# Patient Record
Sex: FEMALE | Race: WHITE | Employment: PART TIME | URBAN - METROPOLITAN AREA
[De-identification: names, ages, dates, MRNs, and addresses within clinical notes are randomized per-mention and may not be internally consistent; named-entity substitution may affect disease eponyms.]

---

## 2019-08-22 ENCOUNTER — TELEPHONE (OUTPATIENT)
Dept: INTERNAL MEDICINE CLINIC | Age: 54
End: 2019-08-22

## 2019-08-22 NOTE — TELEPHONE ENCOUNTER
Patient was last seen 06/2016, requesting to make an acute visit.  Aware she hasnt been seen in 3 years which makes her a new patient at this time and we are not accepting new patients , please double check with PCP

## 2019-08-23 ENCOUNTER — TELEPHONE (OUTPATIENT)
Dept: INTERNAL MEDICINE CLINIC | Age: 54
End: 2019-08-23

## 2019-08-23 ENCOUNTER — OFFICE VISIT (OUTPATIENT)
Dept: INTERNAL MEDICINE CLINIC | Age: 54
End: 2019-08-23

## 2019-08-23 ENCOUNTER — HOSPITAL ENCOUNTER (OUTPATIENT)
Dept: GENERAL RADIOLOGY | Age: 54
Discharge: HOME | End: 2019-08-23
Attending: INTERNAL MEDICINE
Payer: COMMERCIAL

## 2019-08-23 VITALS
TEMPERATURE: 98 F | HEIGHT: 66 IN | DIASTOLIC BLOOD PRESSURE: 79 MMHG | WEIGHT: 147 LBS | RESPIRATION RATE: 18 BRPM | HEART RATE: 95 BPM | SYSTOLIC BLOOD PRESSURE: 117 MMHG | BODY MASS INDEX: 23.63 KG/M2 | OXYGEN SATURATION: 97 %

## 2019-08-23 DIAGNOSIS — M25.542 ARTHRALGIA OF HANDS, BILATERAL: Primary | ICD-10-CM

## 2019-08-23 DIAGNOSIS — M25.572 ARTHRALGIA OF BOTH FEET: ICD-10-CM

## 2019-08-23 DIAGNOSIS — M25.541 ARTHRALGIA OF HANDS, BILATERAL: ICD-10-CM

## 2019-08-23 DIAGNOSIS — M25.541 ARTHRALGIA OF HANDS, BILATERAL: Primary | ICD-10-CM

## 2019-08-23 DIAGNOSIS — M25.542 ARTHRALGIA OF HANDS, BILATERAL: ICD-10-CM

## 2019-08-23 DIAGNOSIS — M25.571 ARTHRALGIA OF BOTH FEET: ICD-10-CM

## 2019-08-23 DIAGNOSIS — I73.00 RAYNAUD'S PHENOMENON WITHOUT GANGRENE: ICD-10-CM

## 2019-08-23 PROCEDURE — 73130 X-RAY EXAM OF HAND: CPT

## 2019-08-23 NOTE — TELEPHONE ENCOUNTER
PSR called patient and left VM advising patient seek care outside of practice today. Please advise PSR if DR. Evelyne Marie is able to see patient as a new patient for future appointments.

## 2019-08-23 NOTE — TELEPHONE ENCOUNTER
----- Message from Mena Perry sent at 8/23/2019  7:52 AM EDT -----  Regarding: MAHAMED Tucker/Telephone  General Message/Vendor Calls    Caller's first and last name:      Reason for call:      Cosimo Dates required yes/no and why: yes      Best contact number(s):   [phone]    Details to clarify the request: Patient has joint pain and it has been just a little over three years can you make an exception to see her today.  Please call as soon as possible       Mena Perry

## 2019-08-23 NOTE — TELEPHONE ENCOUNTER
----- Message from Diaz Mccallum sent at 8/23/2019  7:52 AM EDT -----  Regarding: MAHAMED Tucker/Telephone  General Message/Vendor Calls    Caller's first and last name:      Reason for call:      Yris Rabon required yes/no and why: yes      Best contact number(s):   [phone]    Details to clarify the request: Patient has joint pain and it has been just a little over three years can you make an exception to see her today.  Please call as soon as possible       Diaz Mccallum

## 2019-08-26 NOTE — PROGRESS NOTES
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 Presents with stiffness of both hands and feel, largely of MCP and PIP joints, for a few months. MTPs of feet  It is mild to moderate. Wakes up with moderate s/s. Improves some during the day. No FH of RA  She does work with horses and engages in strenuous pulling       No weakness. Note coldness of fingers in the cold weather for 2 years. No hx of tick bite    Review of Systems   All other systems reviewed and are negative, except as noted in HPI    Past Medical and Surgical History   has a past medical history of Abnormal Pap smear (1988), DDD (degenerative disc disease), lumbar (1990s), Dysplasia of skin, and GERD (gastroesophageal reflux disease). has a past surgical history that includes hx appendectomy (1970s); hx tonsillectomy (1980s); hx endoscopy (02/12/2016); hx hysterectomy (11/15/12); hx colonoscopy (01/15/2019); hx left salpingo-oophorectomy (06/2016); and hx cervical fusion (2017). reports that she has never smoked. She has never used smokeless tobacco. She reports that she drinks alcohol. She reports that she does not use drugs. family history includes Arthritis-osteo in her paternal uncle; Cancer in her maternal grandmother; Cancer (age of onset: 52) in her mother; Diabetes in her maternal grandmother; Parkinson's Disease in her father. Physical Exam   Nursing note and vitals reviewed. Blood pressure 117/79, pulse 95, temperature 98 °F (36.7 °C), temperature source Oral, resp. rate 18, height 5' 6\" (1.676 m), weight 147 lb (66.7 kg), last menstrual period 06/28/2011, SpO2 97 %. Constitutional: In no distress. Eyes: Conjunctivae are normal.  HEENT:  No LAD or thyromegaly   Cardiovascular: Normal rate. regular rhythm.   No murmurs  No edema  Pulmonary/Chest: Effort normal. clear to ausculation blaterally  Musculoskeletal:  no edema. Mild PIP and MCP tenderness  Abd:  Neurological: Alert and oriented. Grossly intact cranial nerves and motor function. Skin: No rash noted. Psychiatric: Normal mood and affect. Behavior is normal.     ASSESSMENT and PLAN  Diagnoses and all orders for this visit:    1. Arthralgia of hands, bilateral  2. Arthralgia of both feet  Suspicious for RA/inflammatory arthritis. Check xrays and labs. Refer prn  Try voltaren gel. She has some left over for her horses  -     METABOLIC PANEL, COMPREHENSIVE  -     CBC W/O DIFF  -     RHEUMATOID FACTOR, QL  -     CYCLIC CITRUL PEPTIDE AB, IGG  -     SED RATE (ESR)  -     C REACTIVE PROTEIN, QT  -     LYME AB/WESTERN BLOT REFLEX  -     XR HAND LT MIN 3 V; Future  -     XR HAND RT MIN 3 V; Future    3. Raynaud's phenomenon without gangrene        lab results and schedule of future lab studies reviewed with patient  reviewed medications and side effects in detail    Return to clinic for further evaluation if new symptoms develop or if current symptoms worsen or fail to resolve. There are no Patient Instructions on file for this visit.

## 2019-08-27 LAB
ALBUMIN SERPL-MCNC: 4.7 G/DL (ref 3.5–5.5)
ALBUMIN/GLOB SERPL: 2 {RATIO} (ref 1.2–2.2)
ALP SERPL-CCNC: 57 IU/L (ref 39–117)
ALT SERPL-CCNC: 12 IU/L (ref 0–32)
AST SERPL-CCNC: 13 IU/L (ref 0–40)
B BURGDOR IGG+IGM SER-ACNC: <0.91 ISR (ref 0–0.9)
B BURGDOR IGM SER IA-ACNC: <0.8 INDEX (ref 0–0.79)
BILIRUB SERPL-MCNC: 0.4 MG/DL (ref 0–1.2)
BUN SERPL-MCNC: 18 MG/DL (ref 6–24)
BUN/CREAT SERPL: 22 (ref 9–23)
CALCIUM SERPL-MCNC: 9.8 MG/DL (ref 8.7–10.2)
CCP IGA+IGG SERPL IA-ACNC: 3 UNITS (ref 0–19)
CHLORIDE SERPL-SCNC: 99 MMOL/L (ref 96–106)
CO2 SERPL-SCNC: 26 MMOL/L (ref 20–29)
CREAT SERPL-MCNC: 0.82 MG/DL (ref 0.57–1)
CRP SERPL-MCNC: <1 MG/L (ref 0–10)
ERYTHROCYTE [DISTWIDTH] IN BLOOD BY AUTOMATED COUNT: 14.2 % (ref 12.3–15.4)
ERYTHROCYTE [SEDIMENTATION RATE] IN BLOOD BY WESTERGREN METHOD: 4 MM/HR (ref 0–40)
GLOBULIN SER CALC-MCNC: 2.4 G/DL (ref 1.5–4.5)
GLUCOSE SERPL-MCNC: 86 MG/DL (ref 65–99)
HCT VFR BLD AUTO: 39.5 % (ref 34–46.6)
HGB BLD-MCNC: 13.2 G/DL (ref 11.1–15.9)
MCH RBC QN AUTO: 27.7 PG (ref 26.6–33)
MCHC RBC AUTO-ENTMCNC: 33.4 G/DL (ref 31.5–35.7)
MCV RBC AUTO: 83 FL (ref 79–97)
PLATELET # BLD AUTO: 266 X10E3/UL (ref 150–450)
POTASSIUM SERPL-SCNC: 4.3 MMOL/L (ref 3.5–5.2)
PROT SERPL-MCNC: 7.1 G/DL (ref 6–8.5)
RBC # BLD AUTO: 4.76 X10E6/UL (ref 3.77–5.28)
RHEUMATOID FACT SERPL-ACNC: <10 IU/ML (ref 0–13.9)
SODIUM SERPL-SCNC: 141 MMOL/L (ref 134–144)
TSH SERPL DL<=0.005 MIU/L-ACNC: 1.95 UIU/ML (ref 0.45–4.5)
WBC # BLD AUTO: 5.6 X10E3/UL (ref 3.4–10.8)

## 2020-01-22 ENCOUNTER — OFFICE VISIT (OUTPATIENT)
Dept: INTERNAL MEDICINE CLINIC | Age: 55
End: 2020-01-22

## 2020-01-22 VITALS
DIASTOLIC BLOOD PRESSURE: 75 MMHG | HEART RATE: 79 BPM | RESPIRATION RATE: 16 BRPM | WEIGHT: 148.21 LBS | TEMPERATURE: 98.1 F | OXYGEN SATURATION: 96 % | SYSTOLIC BLOOD PRESSURE: 158 MMHG | BODY MASS INDEX: 23.92 KG/M2

## 2020-01-22 DIAGNOSIS — H83.2X9 VESTIBULAR DISEQUILIBRIUM, UNSPECIFIED LATERALITY: Primary | ICD-10-CM

## 2020-01-22 RX ORDER — ONDANSETRON 8 MG/1
8 TABLET, ORALLY DISINTEGRATING ORAL
Qty: 10 TAB | Refills: 0 | Status: SHIPPED | OUTPATIENT
Start: 2020-01-22 | End: 2021-09-08 | Stop reason: ALTCHOICE

## 2020-01-22 RX ORDER — MECLIZINE HYDROCHLORIDE 25 MG/1
25 TABLET ORAL
Qty: 20 TAB | Refills: 1 | Status: SHIPPED | OUTPATIENT
Start: 2020-01-22 | End: 2020-02-01

## 2020-01-22 NOTE — PROGRESS NOTES
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 HPI  Vertigo: Pt reports that she woke at 5 am, rolled left, sat up and broke out in a cold sweat with disequilibrium. She feels like she is on a boat. Of note, her ears felt off over the last few weeks but no pressure, discomfort, or pain. She called out of work due to not being able to drive the hour to work with her vertigo. Confirms nausea and dizziness. Denies recent illness/ear infection, fever, different yoga poses, or exercises. Joint pain: Pt continues to continue with joint pain, which is worse in the mornings. Continues using Voltaren gel PRN. Pt is working part-time at [employer]. She has a farm approx 1h away with horses. Her  notes that she is basically working full time. Review of Systems   Musculoskeletal: Positive for joint pain. Neurological: Positive for dizziness (vertigo). All other systems reviewed and are negative. Physical Exam  Constitutional:       Appearance: Normal appearance. HENT:      Right Ear: Hearing, tympanic membrane and external ear normal.      Left Ear: Hearing, tympanic membrane and external ear normal.      Mouth/Throat:      Mouth: Mucous membranes are moist.      Pharynx: Oropharynx is clear. Cardiovascular:      Rate and Rhythm: Normal rate and regular rhythm. Pulmonary:      Effort: Pulmonary effort is normal.      Breath sounds: Normal breath sounds and air entry. Musculoskeletal: Normal range of motion. Skin:     General: Skin is warm and dry. Neurological:      General: No focal deficit present. Mental Status: She is alert and oriented to person, place, and time. Psychiatric:         Mood and Affect: Mood normal.         Behavior: Behavior normal.         ASSESSMENT and PLAN  Diagnoses and all orders for this visit:    1. Vestibular disequilibrium, unspecified laterality  Prescribed Zofran and Meclizine for pt to use PRN. Discussed and described PT with pt. Informed pt that Klonopin is a great option for severe vertigo at night, but since her sx do not seem severe- Meclizine should dissipate her sx. Explained that PT is a great option to help realign the inner ear crystals. Will continue to monitor for improvements or changes. Other orders  -     meclizine (ANTIVERT) 25 mg tablet; Take 1 Tab by mouth three (3) times daily as needed for Dizziness for up to 10 days. -     ondansetron (ZOFRAN ODT) 8 mg disintegrating tablet; Take 1 Tab by mouth every eight (8) hours as needed for Nausea or Vomiting. Additional comments: Discussed with pt that her labs came back negative for RA, and her joint pain is likely due to osteoarthritis. Continues on Voltaren gel PRN with relief. Lab results and schedule of future lab studies reviewed with patient. Reviewed diet, exercise and weight control. Written by Everardo Hoover, as dictated by Rosa Elena Francis MD.     Current diagnosis and concerns discussed with pt at length. Understands risks and benefits or current treatment plan and medications and accepts the treatment and medication with any possible risks. Pt asks appropriate questions which were answered. Pt instructed to call with any concerns or problems. This note will not be viewable in [address]

## 2020-07-30 ENCOUNTER — TELEPHONE (OUTPATIENT)
Dept: CARDIOLOGY CLINIC | Age: 55
End: 2020-07-30

## 2020-08-17 ENCOUNTER — OFFICE VISIT (OUTPATIENT)
Dept: CARDIOLOGY CLINIC | Age: 55
End: 2020-08-17
Payer: COMMERCIAL

## 2020-08-17 VITALS
HEIGHT: 66 IN | DIASTOLIC BLOOD PRESSURE: 82 MMHG | HEART RATE: 92 BPM | OXYGEN SATURATION: 97 % | WEIGHT: 149.6 LBS | SYSTOLIC BLOOD PRESSURE: 130 MMHG | BODY MASS INDEX: 24.04 KG/M2 | RESPIRATION RATE: 16 BRPM

## 2020-08-17 DIAGNOSIS — M51.36 DDD (DEGENERATIVE DISC DISEASE), LUMBAR: ICD-10-CM

## 2020-08-17 DIAGNOSIS — K21.9 GASTROESOPHAGEAL REFLUX DISEASE WITHOUT ESOPHAGITIS: ICD-10-CM

## 2020-08-17 DIAGNOSIS — R00.0 TACHYCARDIA: Primary | ICD-10-CM

## 2020-08-17 PROCEDURE — 93000 ELECTROCARDIOGRAM COMPLETE: CPT | Performed by: INTERNAL MEDICINE

## 2020-08-17 PROCEDURE — 99205 OFFICE O/P NEW HI 60 MIN: CPT | Performed by: INTERNAL MEDICINE

## 2020-08-17 RX ORDER — METOPROLOL TARTRATE 25 MG/1
12.5 TABLET, FILM COATED ORAL DAILY
COMMUNITY
End: 2021-09-08 | Stop reason: ALTCHOICE

## 2020-08-17 NOTE — PROGRESS NOTES
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 Katie Davison. She noted HR's in the 110-130 range. Later in 2014, she underwent Holter monitoring with Dr. Sandra Langston that demonstrated a long RP tachycardia at 155 bpm. She has been on metoprolol tartrate 25 mg which she predominantly takes daily (rather than BID). Recently she has noted HR's in the 110-115 bpm range. Though she denies edema, syncope, SOB, fatigue, and chest pain she wishes to address her risk for tachycardia-induced cardiomyopathy.         PAST MEDICAL HISTORY     Past Medical History:   Diagnosis Date    Abnormal Pap smear 1988    DDD (degenerative disc disease), lumbar 1990s    L4-L5 spinal stenosis, disc herniation 1990s, saw Dr. Donald Nunez Dysplasia of skin     Dr. Kyle Martinez GERD (gastroesophageal reflux disease)     EGD 1990s \"normal\"           PAST SURGICAL HISTORY     Past Surgical History:   Procedure Laterality Date    HX APPENDECTOMY  1970s    HX CERVICAL FUSION  2017    Dr. Elliott Quiroga    HX COLONOSCOPY  01/15/2019    normal    HX ENDOSCOPY  02/12/2016    normal    HX HYSTERECTOMY  11/15/12    benign fibroids; ovaries intact    HX LEFT SALPINGO-OOPHORECTOMY  06/2016    HX TONSILLECTOMY  1980s          ALLERGIES     No Known Allergies       FAMILY HISTORY     Family History   Problem Relation Age of Onset    Cancer Mother 52        melanoma    Diabetes Maternal Grandmother     Cancer Maternal Grandmother         melanoma    Parkinson's Disease Father     Arthritis-osteo Paternal Uncle     negative for cardiac disease       SOCIAL HISTORY     Social History     Socioeconomic History    Marital status:      Spouse name: Jason Ray Number of children: 0    Years of education: Not on file    Highest education level: Not on file   Occupational History    Occupation: Medical Doctor Internal Medicine Tobacco Use    Smoking status: Never Smoker    Smokeless tobacco: Never Used   Substance and Sexual Activity    Alcohol use: Yes     Alcohol/week: 2.0 - 3.0 standard drinks     Types: 2 - 3 Glasses of wine per week     Comment: occas    Drug use: No    Sexual activity: Yes         MEDICATIONS     Current Outpatient Medications   Medication Sig    metoprolol tartrate (LOPRESSOR) 25 mg tablet Take 12.5 mg by mouth daily.  ondansetron (ZOFRAN ODT) 8 mg disintegrating tablet Take 1 Tab by mouth every eight (8) hours as needed for Nausea or Vomiting.  lidocaine (LIDODERM) 5 % Apply patch to the affected area for 12 hours a day and remove for 12 hours a day. No current facility-administered medications for this visit. I have reviewed the nurses notes, vitals, problem list, allergy list, medical history, family, social history and medications. REVIEW OF SYMPTOMS      General: Pt denies excessive weight gain or loss. Pt is able to conduct ADL's  HEENT: Denies blurred vision, headaches, hearing loss, epistaxis and difficulty swallowing. Respiratory: Denies cough, congestion, shortness of breath, AQUINO, wheezing or stridor. Cardiovascular: Denies precordial pain, palpitations, edema or PND  Gastrointestinal: Denies poor appetite, indigestion, abdominal pain or blood in stool  Genitourinary: Denies hematuria, dysuria, increased urinary frequency  Musculoskeletal: Denies joint pain or swelling from muscles or joints  Neurologic: Denies tremor, paresthesias, headache, or sensory motor disturbance  Psychiatric: Denies confusion, insomnia, depression  Integumentray: Denies rash, itching or ulcers. Hematologic: Denies easy bruising, bleeding       PHYSICAL EXAMINATION      Vitals: see vitals section  General: Well developed, in no acute distress. HEENT: No jaundice, oral mucosa moist, no oral ulcers  Neck: Supple, no stiffness, no lymphadenopathy, supple  Heart:  Normal S1/S2 negative S3 or S4. Regular, no murmur, gallop or rub, no jugular venous distention  Respiratory: Clear bilaterally x 4, no wheezing or rales  Abdomen:   Soft, non-tender, bowel sounds are active. Extremities:  No edema, normal cap refill, no cyanosis. Musculoskeletal: No clubbing, no deformities  Neuro: A&Ox3, speech clear, gait stable, cooperative, no focal neurologic deficits  Skin: Skin color is normal. No rashes or lesions. Non diaphoretic, moist.  Vascular: 2+ pulses symmetric in all extremities       DIAGNOSTIC DATA      EKG:        LABORATORY DATA      Lab Results   Component Value Date/Time    WBC 5.6 08/23/2019 03:29 PM    HGB 13.2 08/23/2019 03:29 PM    HCT 39.5 08/23/2019 03:29 PM    PLATELET 825 61/86/7997 03:29 PM    MCV 83 08/23/2019 03:29 PM      Lab Results   Component Value Date/Time    Sodium 141 08/23/2019 03:29 PM    Potassium 4.3 08/23/2019 03:29 PM    Chloride 99 08/23/2019 03:29 PM    CO2 26 08/23/2019 03:29 PM    Anion gap 9 08/23/2010 09:26 AM    Glucose 86 08/23/2019 03:29 PM    BUN 18 08/23/2019 03:29 PM    Creatinine 0.82 08/23/2019 03:29 PM    BUN/Creatinine ratio 22 08/23/2019 03:29 PM    GFR est AA 95 08/23/2019 03:29 PM    GFR est non-AA 83 08/23/2019 03:29 PM    Calcium 9.8 08/23/2019 03:29 PM    Bilirubin, total 0.4 08/23/2019 03:29 PM    Alk. phosphatase 57 08/23/2019 03:29 PM    Protein, total 7.1 08/23/2019 03:29 PM    Albumin 4.7 08/23/2019 03:29 PM    Globulin 3.4 08/23/2010 09:26 AM    A-G Ratio 2.0 08/23/2019 03:29 PM    ALT (SGPT) 12 08/23/2019 03:29 PM           ASSESSMENT      1. Tachycardia  2. GERD       PLAN     Patient will invest in an iWatch for rhythm monitoring. She will monitor for 1-2 weeks while off metoprolol. She will send strips for my review to guide future direction (ie increase toprol, consider MRV, consider EPS?). ICD-10-CM ICD-9-CM    1.  Tachycardia  R00.0 785.0 AMB POC EKG ROUTINE W/ 12 LEADS, INTER & REP   2. Gastroesophageal reflux disease without esophagitis K21.9 530.81    3. DDD (degenerative disc disease), lumbar  M51.36 722.52      Orders Placed This Encounter    AMB POC EKG ROUTINE W/ 12 LEADS, INTER & REP     Order Specific Question:   Reason for Exam:     Answer: Tachycardia    metoprolol tartrate (LOPRESSOR) 25 mg tablet     Sig: Take 12.5 mg by mouth daily. FOLLOW-UP     TBD      Thank you, Erma Schwab, MD for allowing me to participate in the care of this extraordinarily pleasant female. Please do not hesitate to contact me for further questions/concerns.          Rianna Gonsalez MD  Cardiac Electrophysiology / Cardiology    [address]  [address], [address] [facility], [address]  [place] [address]    Dashawn Henderson  [phone] / [phone] Fax   [phone] / [phone] Fax

## 2020-08-17 NOTE — PROGRESS NOTES
ROOM # 3  Baseline , has seen 2 other cardiologist Dr. Kristen Mendoza and Dr. Lawler Safe  Visit Vitals  /82 (BP 1 Location: Left arm, BP Patient Position: Sitting)   Pulse 92   Resp 16   Ht 5' 6\" (1.676 m)   Wt 149 lb 9.6 oz (67.9 kg)   LMP 06/28/2011   SpO2 97%   BMI 24.15 kg/m²

## 2021-01-05 ENCOUNTER — PATIENT MESSAGE (OUTPATIENT)
Dept: INTERNAL MEDICINE CLINIC | Age: 56
End: 2021-01-05

## 2021-09-08 ENCOUNTER — OFFICE VISIT (OUTPATIENT)
Dept: INTERNAL MEDICINE CLINIC | Age: 56
End: 2021-09-08
Payer: COMMERCIAL

## 2021-09-08 VITALS
DIASTOLIC BLOOD PRESSURE: 84 MMHG | RESPIRATION RATE: 14 BRPM | HEIGHT: 66 IN | BODY MASS INDEX: 24.15 KG/M2 | HEART RATE: 82 BPM | SYSTOLIC BLOOD PRESSURE: 127 MMHG | OXYGEN SATURATION: 98 % | TEMPERATURE: 98.3 F

## 2021-09-08 DIAGNOSIS — R53.82 CHRONIC FATIGUE: ICD-10-CM

## 2021-09-08 DIAGNOSIS — R06.83 SNORING: ICD-10-CM

## 2021-09-08 DIAGNOSIS — G47.30 SLEEP APNEA, UNSPECIFIED TYPE: ICD-10-CM

## 2021-09-08 DIAGNOSIS — G47.00 INSOMNIA, UNSPECIFIED TYPE: ICD-10-CM

## 2021-09-08 DIAGNOSIS — R06.81 WITNESSED APNEIC SPELLS: Primary | ICD-10-CM

## 2021-09-08 DIAGNOSIS — R40.0 DAYTIME SOMNOLENCE: ICD-10-CM

## 2021-09-08 PROCEDURE — 99213 OFFICE O/P EST LOW 20 MIN: CPT | Performed by: INTERNAL MEDICINE

## 2021-09-09 NOTE — PROGRESS NOTES
HISTORY OF PRESENT ILLNESS    Chief Complaint   Patient presents with    Sleep Apnea     f/u       Presents for follow-up  Reports fatigue. Does not feel rest for much of the day. She is not sleeping well. She is waking up gasping at times. Some issues staying asleep. Review of Systems   All other systems reviewed and are negative, except as noted in HPI    Past Medical and Surgical History   has a past medical history of Abnormal Pap smear (1988), CTS (carpal tunnel syndrome), DDD (degenerative disc disease), lumbar (1990s), Dysplasia of skin, FH: melanoma, GERD (gastroesophageal reflux disease), and Tachycardia. has a past surgical history that includes hx appendectomy (1970s); hx tonsillectomy (1980s); hx endoscopy (02/12/2016); hx hysterectomy (11/15/12); hx colonoscopy (01/15/2019); hx left salpingo-oophorectomy (06/2016); hx cervical fusion (2017); and hx endoscopy (03/09/2021). reports that she has never smoked. She has never used smokeless tobacco. She reports current alcohol use of about 2.0 - 3.0 standard drinks of alcohol per week. She reports that she does not use drugs. family history includes Arthritis-osteo in her paternal uncle; Cancer in her brother and maternal grandmother; Cancer (age of onset: 52) in her mother; Diabetes in her maternal grandmother; Parkinson's Disease in her father. Physical Exam   Nursing note and vitals reviewed. Blood pressure 127/84, pulse 82, temperature 98.3 °F (36.8 °C), temperature source Oral, resp. rate 14, height 5' 6\" (1.676 m), last menstrual period 06/28/2011, SpO2 98 %. Constitutional:  No distress. Eyes: Conjunctivae are normal.   Ears:  Hearing grossly intact  Cardiovascular: Normal rate. regular rhythm, no murmurs or gallops  No edema  Pulmonary/Chest: Effort normal.   CTAB  Musculoskeletal: moves all 4 extremities   Neurological: Alert and oriented to person, place, and time. Skin: No visible rash noted.    Psychiatric: Normal mood and affect. Behavior is normal.     ASSESSMENT and PLAN  Diagnoses and all orders for this visit:    1. Witnessed apneic spells  2. Daytime somnolence  3. Insomnia, unspecified type  4. Sleep apnea, unspecified type  5. Chronic fatigue  6. Snoring  -     REFERRAL TO SLEEP STUDIES at home to evaluate for MERLY    lab results and schedule of future lab studies reviewed with patient  reviewed medications and side effects in detail    Return to clinic for further evaluation if new symptoms develop        No current outpatient medications on file. No current facility-administered medications for this visit.

## 2022-09-28 ENCOUNTER — OFFICE VISIT (OUTPATIENT)
Dept: INTERNAL MEDICINE CLINIC | Age: 57
End: 2022-09-28
Payer: COMMERCIAL

## 2022-09-28 VITALS
SYSTOLIC BLOOD PRESSURE: 121 MMHG | WEIGHT: 144.6 LBS | BODY MASS INDEX: 23.24 KG/M2 | DIASTOLIC BLOOD PRESSURE: 80 MMHG | OXYGEN SATURATION: 98 % | HEIGHT: 66 IN | TEMPERATURE: 98.8 F | RESPIRATION RATE: 16 BRPM | HEART RATE: 104 BPM

## 2022-09-28 DIAGNOSIS — M62.838 NECK MUSCLE SPASM: ICD-10-CM

## 2022-09-28 DIAGNOSIS — Z80.8 FH: MELANOMA: ICD-10-CM

## 2022-09-28 DIAGNOSIS — M26.609 TMJ (TEMPOROMANDIBULAR JOINT DISORDER): ICD-10-CM

## 2022-09-28 DIAGNOSIS — D22.5: Primary | ICD-10-CM

## 2022-09-28 PROBLEM — M54.2 CERVICAL SPINE PAIN: Status: ACTIVE | Noted: 2022-09-28

## 2022-09-28 PROCEDURE — 99213 OFFICE O/P EST LOW 20 MIN: CPT | Performed by: INTERNAL MEDICINE

## 2022-09-28 RX ORDER — BACLOFEN 10 MG/1
10 TABLET ORAL
Qty: 30 TABLET | Refills: 2 | Status: SHIPPED | OUTPATIENT
Start: 2022-09-28

## 2022-09-28 NOTE — PROGRESS NOTES
HISTORY OF PRESENT ILLNESS    Chief Complaint   Patient presents with    Sleep Problem     F/up       Presents for follow-up    She is concerned about a mole on her left lower back which is irregular. Not clear how long it has been there or if it Is changing. FH of melanoma in her mother who sadly  ot it age 45s. Also brother dx in age 45s. She saw Dr. Carole Golden in the past.  She was able to make an appt in Feb with Dr. Cynthia Gonzalez. Reports TMJ pains. Saw dentist and was given bite guard. Has spasm and also has neck pains and spasm. No weakness. Review of Systems   All other systems reviewed and are negative, except as noted in HPI    Past Medical and Surgical History   has a past medical history of Abnormal Pap smear (), CTS (carpal tunnel syndrome), DDD (degenerative disc disease), lumbar (), Dysplasia of skin, FH: melanoma, GERD (gastroesophageal reflux disease), Insomnia, and Tachycardia. has a past surgical history that includes hx appendectomy (); hx tonsillectomy (); hx endoscopy (2016); hx hysterectomy (11/15/12); hx colonoscopy (01/15/2019); hx left salpingo-oophorectomy (2016); hx cervical fusion (); and hx endoscopy (2021). reports that she has never smoked. She has never used smokeless tobacco. She reports current alcohol use of about 2.0 - 3.0 standard drinks per week. She reports that she does not use drugs. family history includes Cancer in her brother and maternal grandmother; Cancer (age of onset: 52) in her mother; Diabetes in her maternal grandmother; OSTEOARTHRITIS in her paternal uncle; Parkinson's Disease in her father. Physical Exam   Nursing note and vitals reviewed. Blood pressure 121/80, pulse (!) 104, temperature 98.8 °F (37.1 °C), temperature source Oral, resp. rate 16, height 5' 6\" (1.676 m), weight 144 lb 9.6 oz (65.6 kg), last menstrual period 2011, SpO2 98 %. Constitutional:  No distress.     Eyes: Conjunctivae are normal.   Ears:  Hearing grossly intact  Cardiovascular: Normal rate. regular rhythm, no murmurs or gallops  No edema  Pulmonary/Chest: Effort normal.   CTAB  Musculoskeletal: moves all 4 extremities   Neurological: Alert and oriented to person, place, and time. Skin: No visible rash noted. Psychiatric: Normal mood and affect. Behavior is normal.   Left lower back        Assessment and Plan    Diagnoses and all orders for this visit:    1. Atypical nevus of left lower back  2. FH: melanoma  Atypical, but unclear if any changes over time. Recommend consultation with dermatology. I was able to share images with Chloé Salas NP at [email or web address], who will see her soon.  -     REFERRAL TO DERMATOLOGY    3. TMJ (temporomandibular joint disorder)  4. Neck muscle spasm  Uncontrolled. TMJ s/s and neck spasm. S/p cervical fusion. Recommend trial of baclofen 1/4-1/2 pill hs.    -     baclofen (LIORESAL) 10 mg tablet; Take 1 Tablet by mouth nightly.     lab results and schedule of future lab studies reviewed with patient  reviewed medications and side effects in detail    Return to clinic for further evaluation if new symptoms develop

## 2023-02-02 ENCOUNTER — HOSPITAL ENCOUNTER (OUTPATIENT)
Age: 58
Setting detail: OUTPATIENT SURGERY
Discharge: HOME | End: 2023-02-02
Attending: INTERNAL MEDICINE | Admitting: INTERNAL MEDICINE
Payer: COMMERCIAL

## 2023-02-02 VITALS
HEIGHT: 66 IN | RESPIRATION RATE: 16 BRPM | BODY MASS INDEX: 22.5 KG/M2 | OXYGEN SATURATION: 100 % | WEIGHT: 140 LBS | HEART RATE: 80 BPM | TEMPERATURE: 98.7 F | SYSTOLIC BLOOD PRESSURE: 110 MMHG | DIASTOLIC BLOOD PRESSURE: 76 MMHG

## 2023-02-02 PROCEDURE — 76040000019: Performed by: INTERNAL MEDICINE

## 2023-02-02 PROCEDURE — 2709999900 HC NON-CHARGEABLE SUPPLY: Performed by: INTERNAL MEDICINE

## 2023-02-02 RX ORDER — HYDROMORPHONE HYDROCHLORIDE 1 MG/ML
.25-1 INJECTION, SOLUTION INTRAMUSCULAR; INTRAVENOUS; SUBCUTANEOUS
Status: DISCONTINUED | OUTPATIENT
Start: 2023-02-02 | End: 2023-02-02 | Stop reason: HOSPADM

## 2023-02-02 RX ORDER — DEXTROMETHORPHAN/PSEUDOEPHED 2.5-7.5/.8
1.2 DROPS ORAL
Status: DISCONTINUED | OUTPATIENT
Start: 2023-02-02 | End: 2023-02-02 | Stop reason: HOSPADM

## 2023-02-02 RX ORDER — EPINEPHRINE 0.1 MG/ML
1 INJECTION INTRACARDIAC; INTRAVENOUS
Status: DISCONTINUED | OUTPATIENT
Start: 2023-02-02 | End: 2023-02-02 | Stop reason: HOSPADM

## 2023-02-02 RX ORDER — MIDAZOLAM HYDROCHLORIDE 1 MG/ML
.25-5 INJECTION, SOLUTION INTRAMUSCULAR; INTRAVENOUS
Status: DISCONTINUED | OUTPATIENT
Start: 2023-02-02 | End: 2023-02-02 | Stop reason: HOSPADM

## 2023-02-02 RX ORDER — ATROPINE SULFATE 0.1 MG/ML
0.4 INJECTION INTRAVENOUS
Status: DISCONTINUED | OUTPATIENT
Start: 2023-02-02 | End: 2023-02-02 | Stop reason: HOSPADM

## 2023-02-02 RX ORDER — SODIUM CHLORIDE 9 MG/ML
50 INJECTION, SOLUTION INTRAVENOUS CONTINUOUS
Status: DISCONTINUED | OUTPATIENT
Start: 2023-02-02 | End: 2023-02-02 | Stop reason: HOSPADM

## 2023-02-02 RX ORDER — DIPHENHYDRAMINE HYDROCHLORIDE 50 MG/ML
12.5 INJECTION, SOLUTION INTRAMUSCULAR; INTRAVENOUS AS NEEDED
Status: DISCONTINUED | OUTPATIENT
Start: 2023-02-02 | End: 2023-02-02 | Stop reason: HOSPADM

## 2023-02-02 RX ORDER — FLUMAZENIL 0.1 MG/ML
0.2 INJECTION INTRAVENOUS
Status: DISCONTINUED | OUTPATIENT
Start: 2023-02-02 | End: 2023-02-02 | Stop reason: HOSPADM

## 2023-02-02 RX ORDER — SODIUM CHLORIDE, SODIUM LACTATE, POTASSIUM CHLORIDE, CALCIUM CHLORIDE 600; 310; 30; 20 MG/100ML; MG/100ML; MG/100ML; MG/100ML
125 INJECTION, SOLUTION INTRAVENOUS CONTINUOUS
Status: DISCONTINUED | OUTPATIENT
Start: 2023-02-02 | End: 2023-02-02 | Stop reason: HOSPADM

## 2023-02-02 RX ORDER — NALOXONE HYDROCHLORIDE 0.4 MG/ML
0.4 INJECTION, SOLUTION INTRAMUSCULAR; INTRAVENOUS; SUBCUTANEOUS
Status: DISCONTINUED | OUTPATIENT
Start: 2023-02-02 | End: 2023-02-02 | Stop reason: HOSPADM

## 2023-02-02 NOTE — PERIOP NOTES
Anesthesia staff at patient's bedside administering anesthesia and monitoring patients vital signs throughout procedure. See anesthesia note. Endoscope was pre-cleaned at bedside immediately following procedure by chrissy. Pt transported to recovery, placed on bedside cardiac monitor. VSS. Report given to PACU RN [facility] using SBAR format. Opportunity for questions provided, and answered.

## 2023-02-02 NOTE — PROGRESS NOTES
Angélica Beeing  1965  [id number]    Situation:  Verbal report received from:   Carson Perla RN   Procedure: Procedure(s): FLEXIBLE SIGMOIDOSCOPY    Background:    Preoperative diagnosis: RECTAL BLEEDING  Postoperative diagnosis: * No post-op diagnosis entered *    :  Dr. Casey Valdovinos   Assistant(s): Endoscopy Technician-1: Binh De LPN  Endoscopy RN-1: Janell Peterson RN    Specimens: * No specimens in log *  H. Pylori  no    Assessment:  Intra-procedure medications     Anesthesia gave intra-procedure sedation and medications, see anesthesia flow sheet no anesthesia     Intravenous fluids: NS@ KVO     Vital signs stable   yes    Abdominal assessment: round and soft   yes    Recommendation:  Discharge patient per MD order  yes.   Return to floor  outpatient  Family or Friend   self driving no anesthesia  Permission to share finding with family or friend yes and n/a

## 2023-02-02 NOTE — PROCEDURES
Merari Blackwood M.D.  [phone]            2023          Flexible sigmoidoscopy Operative Report  Angélica Duncan  :  1965  [facility] Medical Record Number:  [id number]      Indications:    Lower rectal bleeding     :  Santi George MD    Referring Provider: Jasmeet Jeff MD    Sedation:  None    Pre-Procedural Exam:      Airway: clear,  No airway problems anticipated  Heart: RRR, without gallops or rubs  Lungs: clear bilaterally without wheezes, crackles, or rhonchi  Abdomen: soft, nontender, nondistended, bowel sounds present  Mental Status: awake, alert and oriented to person, place and time     Procedure Details:  After informed consent was obtained with all risks and benefits of procedure explained and preoperative exam completed, the patient was taken to the endoscopy suite and placed in the left lateral decubitus position. Upon sequential sedation as per above, a digital rectal exam was performed. The Olympus videocolonoscope  was inserted in the rectum and carefully advanced to the descending colon. The quality of preparation was excellent. The colonoscope was slowly withdrawn with careful inspection and evaluation between folds. Retroflexion in the rectum was performed. Findings:     The pediatric scope was advanced from the rectum into the sigmoid and distal descending colon. A sharp turn prevented further passage comfortably as she was not sedated. Gradual retrieval revealed normal mucosa. Small internal hemorrhoids were noted. Otherwise no lesions seen through the visualized area. Interventions:  none    Specimen Removed:  none    Complications: None. EBL:  None.     Impression:  Small internal hemorrhoids, otherwise mucosa within normal.    Recommendations:  - Maintain a high fiber diet  - Start daily fiber supplements  - Follow-up office visit if bleeding is recurrent, rubber band ligation can be performed    Discharge Disposition: Home in the company of a  when able to ambulate.     Sina Leone MD  2/2/2023  1:21 PM

## 2023-02-02 NOTE — PROGRESS NOTES
Endoscopy discharge instructions have been reviewed and given to patient. The patient verbalized understanding and acceptance of instructions. Dr. Josselyn Quiroz discussed with patient procedure findings and next steps.

## 2023-02-02 NOTE — DISCHARGE INSTRUCTIONS
[facility]. Tanner Munoz M.D.  [phone]            FLEXIBLE SIGMOIDOSCOPY DISCHARGE INSTRUCTIONS       2023    Bridget Alvarado  :  1965  [facility] Medical Record Number:  [id number]      COLONOSCOPY FINDINGS:  Your sigmoidoscopy showed normal mucosa in the rectum, sigmoid colon and distal descending colon. Small internal hemorrhoids were noted. DISCOMFORT:  Redness at IV site- apply warm compress to area; if redness or soreness persist- contact your physician  There may be a slight amount of blood passed from the rectum  Gaseous discomfort- walking, belching will help relieve any discomfort  You may not operate a vehicle for 12 hours  You may not engage in an occupation involving machinery or appliances for rest of today  You may not drink alcoholic beverages for at least 12 hours  Avoid making any critical decisions for at least 24 hour  DIET:   High fiber diet. - however -  remember your colon is empty and a heavy meal will produce gas. Avoid these foods:  vegetables, fried / greasy foods, carbonated drinks for today     ACTIVITY:  You may resume your normal daily activities it is recommended that you spend the remainder of the day resting -  avoid any strenuous activity. CALL M.D. ANY SIGN OF:   Increasing pain, nausea, vomiting  Abdominal distension (swelling)  New increased bleeding (oral or rectal)  Fever (chills)  Pain in chest area  Bloody discharge from nose or mouth   Shortness of breath    Follow-up Instructions:   Call Dr. Terrence Toscano if any questions or problems. Telephone # [phone]  Start taking fiber supplements daily, such as Citrucel or Benefiber, 1 tablespoon in 8 oz water daily. Please call me if bleeding is recurrent.

## 2023-02-02 NOTE — H&P
The patient is a 65year old female who presents with a complaint of Rectal bleeding. Note for \"Rectal bleeding\": This is telehealth appointment. Normal colonoscopy aside from internal hemorrhoids in 1/2019. Today, she states she started having rectal bleeding a few months ago. Initially had bleeding daily x 1 week but since has had bleeding off and on. No change in bowel habits. No rectal pain or abdominal pain. Problem List/Past Medical Denis Mcbride; 1/16/2023 12:41 PM)  Abdominal pain, LLQ (R10.32)   Pt presents for screening colonoscopy. She has been having chronic LLQ pain which is worse with constipation and better after bowel movements. Has occasional blood on toilet tissue which she notes when she has constipation. She has history of previous abdominal surgeries. She states she has been treated emprically in the past for diverticulitis and has had negative CTs in the past for the pain. Dysphagia (R13.10)   Pt presents with new complaints of dysphagia. Reports this has been ongoing for about a year and has worsened over the past couple of weeks. She has dysphagia to tylenol and sometimes liquids. There has been no regurgitaiton or vomiting. Her GERD symptoms have been well controlled with diet modifications. Constipation (K59.00)    Esophageal reflux (K21.9)      Past Surgical History Denis Mcbride; 1/16/2023 12:41 PM)  Spinal Fusion - Neck    Appendectomy      Allergies Denis Mcbride; 1/16/2023 12:41 PM)  Versed *HYPNOTICS/SEDATIVES/SLEEP DISORDER AGENTS*   Rash. Medication History Denis Mcbride; 1/16/2023 12:42 PM)  Medications Reconciled   predniSONE  (10MG (21) Tab Ther Pack, Oral) Active. No Current Medications  (Taken starting 01/16/2023)  Cyclobenzaprine HCl  (10MG Tablet, Oral daily) Active. Social History Denis Mcbride; 1/16/2023 12:41 PM)  Alcohol Use   Occasional alcohol use. Tobacco Use   Never smoker.   Blood Transfusion   No.  Employment status Full-time. Marital status   . Diagnostic Studies History Sil Arevalo; 1/16/2023 12:41 PM)  Colonoscopy   [01/15/2019]:  Endoscopy   [02/12/2016]:    Health Maintenance History Sil Arevalo; 1/16/2023 12:41 PM)  Pneumovax   None. Flu Vaccine   [2020]: Other Problems Sil Arevalo; 1/16/2023 12:41 PM)  Colon cancer screening (Z12.11)          Review of Systems Sil Arevaol; 1/16/2023 12:41 PM)  General Not Present- Chronic Fatigue, Poor Appetite, Weight Gain and Weight Loss. Skin Not Present- Itching, Rash and Skin Color Changes. HEENT Not Present- Hearing Loss and Vertigo. Respiratory Not Present- Difficulty Breathing and TB exposure. Cardiovascular Not Present- Chest Pain, Use of Antibiotics before Dental Procedures and Use of Blood Thinners. Gastrointestinal Present- See HPI. Musculoskeletal Not Present- Arthritis, Hip Replacement Surgery and Knee Replacement Surgery. Neurological Not Present- Weakness. Psychiatric Not Present- Depression. Endocrine Not Present- Diabetes and Thyroid Problems. Hematology Not Present- Anemia. Vitals Sil Arevalo; 1/16/2023 12:41 PM)  1/16/2023 12:41 PM  Weight: 150 lb   Height: 66 in   Weight was reported by patient. Height was reported by patient. Body Surface Area: 1.77 m²   Body Mass Index: 24.21 kg/m²                Assessment & Plan (Jennifer MANE; 1/16/2023 1:11 PM)  Rectal bleeding (K62.5)  Story: Normal colonoscopy aside from internal hemorrhoids in 1/2019. Impression: We reviewed that it is very likely this is hemorrhoidal bleeding given nature of her symptoms and normal colonoscopy (aside from internal hemorrhoids) in 1/2019. For now, she favored monitoring symptoms and follow up with persistent/worsening symptoms. Do not feel colonoscopy necessary - consider calprotectin if bleeding persists but again, no change in bowel habits. Consider hemorrhoid banding if bleeding persists.   Current Plans  Due to this being a TeleHealth encounter (During QNPHZ-67 public health emergency), evaluation of the following organ systems was limited: Vitals/Constitutional/EENT/Resp/CV/GI//MS/Neuro/Skin/Heme-Lymph-Imm. Pursuant to the emergency declaration under the [address] [place] [address], [address] [place] [address] and the [employer] Act, this Virtual Visit was conducted with patient's (and/or legal guardian's) consent, to reduce the risk of exposure to COVID-19 and provide necessary medical care. Services were provided through a video synchronous discussion virtually to substitute for in-person encounter.         Signed electronically by ALHAJI Knight (1/16/2023 1:12 PM)

## 2023-12-27 LAB — MAMMOGRAPHY, EXTERNAL: NORMAL

## 2024-01-15 ENCOUNTER — HOSPITAL ENCOUNTER (OUTPATIENT)
Facility: HOSPITAL | Age: 59
Discharge: HOME | End: 2024-01-18
Attending: INTERNAL MEDICINE
Payer: COMMERCIAL

## 2024-01-15 ENCOUNTER — APPOINTMENT (OUTPATIENT)
Facility: HOSPITAL | Age: 59
End: 2024-01-15
Attending: INTERNAL MEDICINE
Payer: COMMERCIAL

## 2024-01-15 DIAGNOSIS — R10.32 ABDOMINAL PAIN, LLQ: ICD-10-CM

## 2024-01-15 PROCEDURE — 74177 CT ABD & PELVIS W/CONTRAST: CPT

## 2024-01-15 PROCEDURE — 6360000004 HC RX CONTRAST MEDICATION: Performed by: INTERNAL MEDICINE

## 2024-01-15 RX ADMIN — IOPAMIDOL 100 ML: 755 INJECTION, SOLUTION INTRAVENOUS at 13:38

## 2024-06-06 ENCOUNTER — OFFICE VISIT (OUTPATIENT)
Age: 59
End: 2024-06-06
Payer: COMMERCIAL

## 2024-06-06 VITALS
SYSTOLIC BLOOD PRESSURE: 131 MMHG | OXYGEN SATURATION: 97 % | BODY MASS INDEX: 23.95 KG/M2 | HEIGHT: 66 IN | DIASTOLIC BLOOD PRESSURE: 81 MMHG | RESPIRATION RATE: 19 BRPM | TEMPERATURE: 98 F | HEART RATE: 90 BPM | WEIGHT: 149 LBS
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
## 2024-06-06 DIAGNOSIS — M10.9 PODAGRA: ICD-10-CM

## 2024-06-06 DIAGNOSIS — D23.9 DYSPLASTIC NEVI: ICD-10-CM

## 2024-06-06 DIAGNOSIS — Z78.9 VEGETARIAN: ICD-10-CM

## 2024-06-06 DIAGNOSIS — Z00.00 PREVENTATIVE HEALTH CARE: ICD-10-CM

## 2024-06-06 DIAGNOSIS — L98.8: ICD-10-CM

## 2024-06-06 DIAGNOSIS — K58.1 IRRITABLE BOWEL SYNDROME WITH CONSTIPATION: ICD-10-CM

## 2024-06-06 DIAGNOSIS — Z13.21 ENCOUNTER FOR VITAMIN DEFICIENCY SCREENING: ICD-10-CM

## 2024-06-06 DIAGNOSIS — Z00.00 PREVENTATIVE HEALTH CARE: Primary | ICD-10-CM

## 2024-06-06 DIAGNOSIS — Z80.8 FH: MELANOMA: ICD-10-CM

## 2024-06-06 PROCEDURE — 99396 PREV VISIT EST AGE 40-64: CPT | Performed by: INTERNAL MEDICINE

## 2024-06-06 RX ORDER — LANOLIN ALCOHOL/MO/W.PET/CERES
1000 CREAM (GRAM) TOPICAL DAILY
COMMUNITY

## 2024-06-06 SDOH — ECONOMIC STABILITY: FOOD INSECURITY: WITHIN THE PAST 12 MONTHS, THE FOOD YOU BOUGHT JUST DIDN'T LAST AND YOU DIDN'T HAVE MONEY TO GET MORE.: NEVER TRUE

## 2024-06-06 SDOH — ECONOMIC STABILITY: INCOME INSECURITY: HOW HARD IS IT FOR YOU TO PAY FOR THE VERY BASICS LIKE FOOD, HOUSING, MEDICAL CARE, AND HEATING?: NOT HARD AT ALL

## 2024-06-06 SDOH — ECONOMIC STABILITY: FOOD INSECURITY: WITHIN THE PAST 12 MONTHS, YOU WORRIED THAT YOUR FOOD WOULD RUN OUT BEFORE YOU GOT MONEY TO BUY MORE.: NEVER TRUE

## 2024-06-06 SDOH — ECONOMIC STABILITY: HOUSING INSECURITY
IN THE LAST 12 MONTHS, WAS THERE A TIME WHEN YOU DID NOT HAVE A STEADY PLACE TO SLEEP OR SLEPT IN A SHELTER (INCLUDING NOW)?: NO

## 2024-06-06 ASSESSMENT — PATIENT HEALTH QUESTIONNAIRE - PHQ9
SUM OF ALL RESPONSES TO PHQ9 QUESTIONS 1 & 2: 0
SUM OF ALL RESPONSES TO PHQ QUESTIONS 1-9: 0
2. FEELING DOWN, DEPRESSED OR HOPELESS: NOT AT ALL
SUM OF ALL RESPONSES TO PHQ QUESTIONS 1-9: 0
1. LITTLE INTEREST OR PLEASURE IN DOING THINGS: NOT AT ALL

## 2024-06-06 NOTE — PROGRESS NOTES
Jessica Blanco is a 58 y.o. female  Presenting for her annual checkup and follow-up    Chief Complaint   Patient presents with    Annual Exam     Toe issues going on for months.    Lab Collection     Fasting.     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      Seeing Dr. Bennett.  2 dysplastic nevi on back, R forearm, BCC?    Seeing Dr. Diane Wilder helps IBS-C.      R 1st MTP toe intermittently.  Saw ortho.   Xray neg. No redness, swelling.      Hx multiple skin lesions, dysplastic nevi.  Strong family history of melanoma.  Sees Dr. Sanchez regularly.    Wt Readings from Last 3 Encounters:   06/06/24 67.6 kg (149 lb)   09/28/22 65.6 kg (144 lb 9.6 oz)     Last breast exam: Per GYN  Last PAP/pelvic: none  Last colonoscopy: 01/15/19  Last DEXA:   Health Maintenance   Topic Date Due    Lipids  Never done    Shingles vaccine (1 of 2) Never done    COVID-19 Vaccine (4 - 2023-24 season) 09/01/2023    Flu vaccine (Season Ended) 08/01/2024    Breast cancer screen  12/27/2024    Depression Screen  06/06/2025    Colorectal Cancer Screen  01/15/2029    DTaP/Tdap/Td vaccine (3 - Td or Tdap) 08/26/2029    Hepatitis A vaccine  Aged Out    Hib vaccine  Aged Out    Polio vaccine  Aged Out    Meningococcal (ACWY) vaccine  Aged Out    Pneumococcal 0-64 years Vaccine  Aged Out    Hepatitis B vaccine  Discontinued    Hepatitis C screen  Discontinued    HIV screen  Discontinued       Exercise: moderately active  Diet: generally follows a low fat low cholesterol diet    Vaccinations reviewed  Immunization History   Administered Date(s) Administered    COVID-19, MODERNA BLUE border, Primary or Immunocompromised, (age 12y+), IM, 100 mcg/0.5mL 12/29/2020, 01/29/2021, 11/05/2021    Hepatitis B vaccine 01/01/1999    Influenza, Trivalent PF 10/08/2014    MMR, PRIORIX, M-M-R II, (age 12m+), SC, 0.5mL 05/03/2019    PPD Test 10/08/2014    TDaP, ADACEL (age 10y-64y), BOOSTRIX (age 10y+), IM, 0.5mL 02/01/2008,

## 2024-06-06 NOTE — PROGRESS NOTES
\"Have you been to the ER, urgent care clinic since your last visit?  Hospitalized since your last visit?\"    YES - When: approximately 2 months ago.  Where and Why: [facility] - toe issue.    “Have you seen or consulted any other health care providers outside of [facility] since your last visit?”    YES - When: approximately 4 months ago.  Where and Why: Dr. Vera.            Click Here for Release of Records Request

## 2024-06-07 LAB
ALBUMIN SERPL-MCNC: 4.7 G/DL (ref 3.8–4.9)
ALBUMIN/GLOB SERPL: 2.4 {RATIO} (ref 1.2–2.2)
ALP SERPL-CCNC: 60 IU/L (ref 44–121)
ALT SERPL-CCNC: 14 IU/L (ref 0–32)
AST SERPL-CCNC: 14 IU/L (ref 0–40)
B BURGDOR IGG+IGM SER QL IA: NEGATIVE
BILIRUB SERPL-MCNC: 0.3 MG/DL (ref 0–1.2)
BUN SERPL-MCNC: 12 MG/DL (ref 6–24)
BUN/CREAT SERPL: 13 (ref 9–23)
CALCIUM SERPL-MCNC: 9.6 MG/DL (ref 8.7–10.2)
CHLORIDE SERPL-SCNC: 105 MMOL/L (ref 96–106)
CHOLEST SERPL-MCNC: 176 MG/DL (ref 100–199)
CO2 SERPL-SCNC: 25 MMOL/L (ref 20–29)
CREAT SERPL-MCNC: 0.89 MG/DL (ref 0.57–1)
EGFRCR SERPLBLD CKD-EPI 2021: 76 ML/MIN/1.73
ERYTHROCYTE [DISTWIDTH] IN BLOOD BY AUTOMATED COUNT: 12.8 % (ref 11.7–15.4)
GLOBULIN SER CALC-MCNC: 2 G/DL (ref 1.5–4.5)
GLUCOSE SERPL-MCNC: 89 MG/DL (ref 70–99)
HBA1C MFR BLD: 5.8 % (ref 4.8–5.6)
HCT VFR BLD AUTO: 40.1 % (ref 34–46.6)
HDLC SERPL-MCNC: 87 MG/DL
HGB BLD-MCNC: 13.2 G/DL (ref 11.1–15.9)
IMP & REVIEW OF LAB RESULTS: NORMAL
LDLC SERPL CALC-MCNC: 75 MG/DL (ref 0–99)
MCH RBC QN AUTO: 28.3 PG (ref 26.6–33)
MCHC RBC AUTO-ENTMCNC: 32.9 G/DL (ref 31.5–35.7)
MCV RBC AUTO: 86 FL (ref 79–97)
PLATELET # BLD AUTO: 291 X10E3/UL (ref 150–450)
POTASSIUM SERPL-SCNC: 4.6 MMOL/L (ref 3.5–5.2)
PROT SERPL-MCNC: 6.7 G/DL (ref 6–8.5)
RBC # BLD AUTO: 4.66 X10E6/UL (ref 3.77–5.28)
SODIUM SERPL-SCNC: 141 MMOL/L (ref 134–144)
TRIGL SERPL-MCNC: 73 MG/DL (ref 0–149)
URATE SERPL-MCNC: 4.4 MG/DL (ref 3–7.2)
VIT B12 SERPL-MCNC: 1719 PG/ML (ref 232–1245)
VLDLC SERPL CALC-MCNC: 14 MG/DL (ref 5–40)
WBC # BLD AUTO: 5.3 X10E3/UL (ref 3.4–10.8)

## 2024-06-08 LAB — FERRITIN SERPL-MCNC: 23 NG/ML (ref 15–150)

## 2024-10-23 ENCOUNTER — OFFICE VISIT (OUTPATIENT)
Age: 59
End: 2024-10-23
Payer: COMMERCIAL

## 2024-10-23 VITALS
DIASTOLIC BLOOD PRESSURE: 78 MMHG | RESPIRATION RATE: 20 BRPM | HEART RATE: 90 BPM | WEIGHT: 150 LBS | HEIGHT: 66 IN | TEMPERATURE: 98.2 F | BODY MASS INDEX: 24.11 KG/M2 | OXYGEN SATURATION: 97 % | SYSTOLIC BLOOD PRESSURE: 123 MMHG

## 2024-10-23 DIAGNOSIS — Z80.3 FHX: BREAST CANCER: ICD-10-CM

## 2024-10-23 DIAGNOSIS — C43.22 MELANOMA OF EAR, LEFT (HCC): Primary | ICD-10-CM

## 2024-10-23 DIAGNOSIS — Z80.8 FH: MELANOMA: ICD-10-CM

## 2024-10-23 DIAGNOSIS — Z80.0 FH: PANCREATIC CANCER: ICD-10-CM

## 2024-10-23 PROCEDURE — 99213 OFFICE O/P EST LOW 20 MIN: CPT | Performed by: INTERNAL MEDICINE

## 2024-10-23 NOTE — PROGRESS NOTES
\"Have you been to the ER, urgent care clinic since your last visit?  Hospitalized since your last visit?\"    NO    “Have you seen or consulted any other health care providers outside our system since your last visit?”    YES - When: approximately 2 months ago.  Where and Why: Dr. Steiner - dermatology.

## 2024-10-23 NOTE — PROGRESS NOTES
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    S/p MOHS with Dr. Zambrano and Armando and then plastic surgery with Dr. Lomas.  It was fairly painful postoperatively.  Needs [facility] forms for the period of 24 -10/8/24 to cover the surgical period and postoperative period..     Concerned about risk of further cancer.  Strong family history of cancers.    Melanoma in her mother at age 20 and  of recurrence and metastatic disease in her 40s.  Brother diagnosed with melanoma around age 50.  Maternal grandmother with melanoma.  Maternal grandfather was pushing.  A cancer, paternal grandmother with breast cancer.    She is primarily concerned about risk of pancreatic cancer and whether additional screening would be helpful.  She has no children.  Would like referral to genetic testing.    Wt Readings from Last 3 Encounters:   10/23/24 68 kg (150 lb)   24 67.6 kg (149 lb)   22 65.6 kg (144 lb 9.6 oz)           Review of Systems   All other systems reviewed and are negative, except as noted in HPI    Past Medical and Surgical History   has a past medical history of Abnormal Pap smear, Cervical spine pain, CTS (carpal tunnel syndrome), DDD (degenerative disc disease), lumbar, Dysplastic nevi, FH: melanoma, GERD (gastroesophageal reflux disease), Insomnia, Irritable bowel syndrome with constipation, and Tachycardia.     has a past surgical history that includes Upper gastrointestinal endoscopy (2021); flexible sigmoidoscopy (N/A, 2023); cervical fusion (); Salpingo-oophorectomy (2016); Colonoscopy (01/15/2019); Hysterectomy (11/15/12); Upper gastrointestinal endoscopy (2016); Appendectomy (1970s); and Tonsillectomy ().     reports that she has never smoked. She has never used smokeless tobacco. She reports current

## 2024-11-11 ENCOUNTER — TELEPHONE (OUTPATIENT)
Age: 59
End: 2024-11-11

## 2024-11-11 NOTE — TELEPHONE ENCOUNTER
PSR reached out to patient to reschedule appointment due to provider being out of the office - no answer, left detailed VM with date/time change and sent my chart message [only moved to PM]  If patient returns call, please assist in rescheduling if necessary

## 2024-12-17 ENCOUNTER — HOSPITAL ENCOUNTER (OUTPATIENT)
Facility: HOSPITAL | Age: 59
Discharge: HOME | End: 2024-12-20
Attending: INTERNAL MEDICINE

## 2024-12-17 VITALS — BODY MASS INDEX: 24.21 KG/M2 | WEIGHT: 150 LBS

## 2024-12-17 DIAGNOSIS — D22.9 FAMMM (FAMILIAL ATYPICAL MOLE MALIGNANT MELANOMA) SYNDROME: ICD-10-CM

## 2024-12-17 DIAGNOSIS — K59.00 CONSTIPATION, UNSPECIFIED CONSTIPATION TYPE: ICD-10-CM

## 2024-12-17 DIAGNOSIS — Z15.09 FAMMM (FAMILIAL ATYPICAL MOLE MALIGNANT MELANOMA) SYNDROME: ICD-10-CM

## 2025-04-14 ENCOUNTER — TRANSCRIBE ORDERS (OUTPATIENT)
Facility: HOSPITAL | Age: 60
End: 2025-04-14

## 2025-04-14 DIAGNOSIS — Z80.0 FAMILY HISTORY OF PANCREATIC CANCER: ICD-10-CM

## 2025-04-14 DIAGNOSIS — Z15.09 FAMMM (FAMILIAL ATYPICAL MOLE MALIGNANT MELANOMA) SYNDROME: Primary | ICD-10-CM

## 2025-04-14 DIAGNOSIS — D22.9 FAMMM (FAMILIAL ATYPICAL MOLE MALIGNANT MELANOMA) SYNDROME: Primary | ICD-10-CM

## 2025-08-01 ENCOUNTER — ANESTHESIA (OUTPATIENT)
Facility: HOSPITAL | Age: 60
End: 2025-08-01
Payer: COMMERCIAL

## 2025-08-01 ENCOUNTER — HOSPITAL ENCOUNTER (OUTPATIENT)
Facility: HOSPITAL | Age: 60
Setting detail: OUTPATIENT SURGERY
Discharge: HOME | End: 2025-08-01
Attending: INTERNAL MEDICINE | Admitting: INTERNAL MEDICINE
Payer: COMMERCIAL

## 2025-08-01 ENCOUNTER — ANESTHESIA EVENT (OUTPATIENT)
Facility: HOSPITAL | Age: 60
End: 2025-08-01
Payer: COMMERCIAL

## 2025-08-01 VITALS
TEMPERATURE: 98 F | WEIGHT: 151.68 LBS | SYSTOLIC BLOOD PRESSURE: 113 MMHG | DIASTOLIC BLOOD PRESSURE: 78 MMHG | HEART RATE: 78 BPM | RESPIRATION RATE: 15 BRPM | BODY MASS INDEX: 24.38 KG/M2 | OXYGEN SATURATION: 99 % | HEIGHT: 66 IN

## 2025-08-01 PROCEDURE — 2709999900 HC NON-CHARGEABLE SUPPLY: Performed by: INTERNAL MEDICINE

## 2025-08-01 PROCEDURE — 3600007512: Performed by: INTERNAL MEDICINE

## 2025-08-01 PROCEDURE — 3700000000 HC ANESTHESIA ATTENDED CARE: Performed by: INTERNAL MEDICINE

## 2025-08-01 PROCEDURE — 3600007502: Performed by: INTERNAL MEDICINE

## 2025-08-01 PROCEDURE — 6360000002 HC RX W HCPCS: Performed by: NURSE ANESTHETIST, CERTIFIED REGISTERED

## 2025-08-01 PROCEDURE — 7100000010 HC PHASE II RECOVERY - FIRST 15 MIN: Performed by: INTERNAL MEDICINE

## 2025-08-01 PROCEDURE — 3700000001 HC ADD 15 MINUTES (ANESTHESIA): Performed by: INTERNAL MEDICINE

## 2025-08-01 PROCEDURE — 2580000003 HC RX 258: Performed by: INTERNAL MEDICINE

## 2025-08-01 PROCEDURE — 7100000011 HC PHASE II RECOVERY - ADDTL 15 MIN: Performed by: INTERNAL MEDICINE

## 2025-08-01 RX ORDER — PROPOFOL 10 MG/ML
INJECTION, EMULSION INTRAVENOUS
Status: DISCONTINUED | OUTPATIENT
Start: 2025-08-01 | End: 2025-08-01 | Stop reason: SDUPTHER

## 2025-08-01 RX ORDER — LIDOCAINE HYDROCHLORIDE 20 MG/ML
INJECTION, SOLUTION EPIDURAL; INFILTRATION; INTRACAUDAL; PERINEURAL
Status: DISCONTINUED | OUTPATIENT
Start: 2025-08-01 | End: 2025-08-01 | Stop reason: SDUPTHER

## 2025-08-01 RX ORDER — SODIUM CHLORIDE 9 MG/ML
INJECTION, SOLUTION INTRAVENOUS CONTINUOUS
Status: DISCONTINUED | OUTPATIENT
Start: 2025-08-01 | End: 2025-08-01 | Stop reason: HOSPADM

## 2025-08-01 RX ADMIN — SODIUM CHLORIDE: 9 INJECTION, SOLUTION INTRAVENOUS at 11:34

## 2025-08-01 RX ADMIN — PROPOFOL 150 MCG/KG/MIN: 10 INJECTION, EMULSION INTRAVENOUS at 11:41

## 2025-08-01 RX ADMIN — LIDOCAINE HYDROCHLORIDE 20 MG: 20 INJECTION, SOLUTION EPIDURAL; INFILTRATION; INTRACAUDAL; PERINEURAL at 11:41

## 2025-08-01 RX ADMIN — PROPOFOL 100 MG: 10 INJECTION, EMULSION INTRAVENOUS at 11:42

## 2025-08-01 ASSESSMENT — PAIN - FUNCTIONAL ASSESSMENT: PAIN_FUNCTIONAL_ASSESSMENT: 0-10

## 2025-08-01 ASSESSMENT — PAIN SCALES - GENERAL
PAINLEVEL_OUTOF10: 0

## 2025-08-01 ASSESSMENT — PAIN SCALES - WONG BAKER: WONGBAKER_NUMERICALRESPONSE: NO HURT

## 2025-08-01 NOTE — ANESTHESIA POSTPROCEDURE EVALUATION
Department of Anesthesiology  Postprocedure Note    Patient: Jessica Blanco  MRN: [id number]  YOB: 1965  Date of evaluation: 8/1/2025    Procedure Summary       Date: 08/01/25 Room / Location: [facility] 03 / [facility] ENDOSCOPY    Anesthesia Start: 1134 Anesthesia Stop: 1208    Procedures:       ESOPHAGOGASTRODUODENOSCOPY (Upper GI Region)      ENDOSCOPIC ULTRASOUND (Upper GI Region) Diagnosis:       FH: pancreatic cancer      Atypical mole      (FH: pancreatic cancer [Z80.0])      (Atypical mole [D22.9])    Surgeons: Delfino Vera MD Responsible Provider: Javier Shannon MD    Anesthesia Type: TIVA ASA Status: 2            Anesthesia Type: TIVA    Breana Phase I: Breana Score: 10    Breana Phase II: Breana Score: 9    Anesthesia Post Evaluation    Patient location during evaluation: PACU  Patient participation: complete - patient participated  Level of consciousness: awake and alert and awake  Pain score: 0  Airway patency: patent  Nausea & Vomiting: no vomiting and no nausea  Cardiovascular status: hemodynamically stable  Respiratory status: room air and spontaneous ventilation  Hydration status: stable  There was medical reason for not using a multimodal analgesia pain management approach.    No notable events documented.

## 2025-08-01 NOTE — ANESTHESIA PRE PROCEDURE
Department of Anesthesiology  Preprocedure Note       Name:  Jessica Blanco   Age:  59 y.o.  :  1965                                          MRN:  [id number]         Date:  2025      Surgeon: Surgeon(s):  Delfino Vera MD    Procedure: Procedure(s):  ESOPHAGOGASTRODUODENOSCOPY  ENDOSCOPIC ULTRASOUND    Medications prior to admission:   Prior to Admission medications    Medication Sig Start Date End Date Taking? Authorizing Provider   linaclotide (LINZESS) 145 MCG capsule Take 1 capsule by mouth every morning (before breakfast)    Delfino Vera MD   vitamin B-12 (CYANOCOBALAMIN) 1000 MCG tablet Take 1 tablet by mouth daily    ProviderZaki MD   tretinoin (RETIN-A) 0.025 % cream APPLY a thin layer topically TO affected AREA of FACE AT NIGHT 5/15/24   Provider, MD Zaki       Current medications:    Current Facility-Administered Medications   Medication Dose Route Frequency Provider Last Rate Last Admin   • 0.9 % sodium chloride infusion   IntraVENous Continuous Delfino Vera MD           Allergies:    Allergies   Allergen Reactions   • Versed [Midazolam] Rash       Problem List:    Patient Active Problem List   Diagnosis Code   • Dysplasia of skin L98.8   • Insomnia G47.00   • GERD (gastroesophageal reflux disease) K21.9   • CTS (carpal tunnel syndrome) G56.00   • DDD (degenerative disc disease), lumbar M51.369   • Tachycardia R00.0   • Cervical spine pain M54.2   • Irritable bowel syndrome with constipation K58.1   • Dysplastic nevi D23.9   • FH: melanoma Z80.8   • Vegetarian Z78.9   • Melanoma of ear, left (HCC) C43.22       Past Medical History:        Diagnosis Date   • Abnormal Pap smear    • Cervical spine pain     cervical fusion    • CTS (carpal tunnel syndrome)     Dr. Gorman   • DDD (degenerative disc disease), lumbar     L4-L5 spinal stenosis, disc herniation , saw Dr. Harrell   • Dysplastic nevi     Dr. Bennett   • FAMMM (familial atypical mole malignant melanoma)

## 2025-08-02 PROBLEM — Z15.09: Status: ACTIVE | Noted: 2025-01-01

## 2025-08-02 PROBLEM — D22.9: Status: ACTIVE | Noted: 2025-01-01

## (undated) DEVICE — NEEDLE MBO BLUNTFLL 18GX1.5STDLRMONOJCT

## (undated) DEVICE — SET ADMIN 16ML TBNG L100IN 2 Y INJ SITE IV PIGGY BK DISP (ORDER IN MULIPLES OF 48)

## (undated) DEVICE — KIT COLON W/ 1.1OZ LUB AND 2 END

## (undated) DEVICE — NEEDLE BLNT FLL 18GX1.5 W/FILTR MONOJCT

## (undated) DEVICE — SOLIDIFIER MEDC 1200ML -- CONVERT TO 356117

## (undated) DEVICE — Device

## (undated) DEVICE — BITEBLOCK ENDOSCP 60FR MAXI WHT POLYETH STURDY W/ VELC WVN

## (undated) DEVICE — SOLIDIFIER FLD 2OZ 1500CC N DISINF IN BTL DISP SAFESORB

## (undated) DEVICE — ELECTRODE,RADIOTRANSLUCENT,FOAM,3PK: Brand: MEDLINE

## (undated) DEVICE — CATHETER IV 22GA L1IN OD0.8382-0.9144MM ID0.6096-0.6858MM 382523

## (undated) DEVICE — CONTAINER SPEC 20 ML LID NEUT BUFF FORMALIN 10 % POLYPR STS

## (undated) DEVICE — SIMPLICITY FLUFF UNDERPAD 23X36, MODERATE: Brand: SIMPLICITY

## (undated) DEVICE — SYRINGE MED 5ML STD CLR PLAS LUERLOCK TIP N CTRL DISP

## (undated) DEVICE — CATH IV AUTOGRD BC PNK 20GA 25 -- INSYTE

## (undated) DEVICE — SYRINGE MEDICAL 3ML CLEAR PLASTIC STANDARD NON CONTROL LUERLOCK TIP DISPOSABLE

## (undated) DEVICE — ELECTRODE RADIOTRANSLUCENT FOAM MEDGEL

## (undated) DEVICE — CANNULA CUSH AD W/ 14FT TBG

## (undated) DEVICE — IV STRT KT 3282] LSL INDUSTRIES INC]

## (undated) DEVICE — BAG BELONG PT PERS CLEAR HANDL

## (undated) DEVICE — CANISTER SUCT 1200CC W L6FT DIA5MM TBNG GRDIAN MEDI VAC

## (undated) DEVICE — SET GRAV CK VLV NEEDLESS ST 3 GANGED 4WAY STPCOCK HI FLO 10